# Patient Record
Sex: MALE | Race: WHITE | NOT HISPANIC OR LATINO | Employment: FULL TIME | URBAN - METROPOLITAN AREA
[De-identification: names, ages, dates, MRNs, and addresses within clinical notes are randomized per-mention and may not be internally consistent; named-entity substitution may affect disease eponyms.]

---

## 2017-10-15 ENCOUNTER — HOSPITAL ENCOUNTER (EMERGENCY)
Facility: HOSPITAL | Age: 42
Discharge: HOME | End: 2017-10-15
Attending: EMERGENCY MEDICINE
Payer: COMMERCIAL

## 2017-10-15 VITALS
SYSTOLIC BLOOD PRESSURE: 131 MMHG | WEIGHT: 145 LBS | TEMPERATURE: 98 F | OXYGEN SATURATION: 97 % | HEART RATE: 105 BPM | DIASTOLIC BLOOD PRESSURE: 79 MMHG | BODY MASS INDEX: 23.3 KG/M2 | RESPIRATION RATE: 18 BRPM | HEIGHT: 66 IN
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
## 2017-10-15 DIAGNOSIS — H16.012 CENTRAL CORNEAL ULCER OF LEFT EYE: Primary | ICD-10-CM
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
## 2017-10-15 PROBLEM — H16.002 CORNEAL ULCER OF LEFT EYE: Status: ACTIVE | Noted: 2017-10-15

## 2017-10-15 LAB
GRAM STN SPEC: NORMAL
GRAM STN SPEC: NORMAL

## 2017-10-15 PROCEDURE — 87102 FUNGUS ISOLATION CULTURE: CPT

## 2017-10-15 PROCEDURE — 25000003 PHARM REV CODE 250: Performed by: STUDENT IN AN ORGANIZED HEALTH CARE EDUCATION/TRAINING PROGRAM

## 2017-10-15 PROCEDURE — 87205 SMEAR GRAM STAIN: CPT

## 2017-10-15 PROCEDURE — 87186 SC STD MICRODIL/AGAR DIL: CPT

## 2017-10-15 PROCEDURE — 87070 CULTURE OTHR SPECIMN AEROBIC: CPT

## 2017-10-15 PROCEDURE — 87077 CULTURE AEROBIC IDENTIFY: CPT

## 2017-10-15 PROCEDURE — 87075 CULTR BACTERIA EXCEPT BLOOD: CPT

## 2017-10-15 PROCEDURE — 96372 THER/PROPH/DIAG INJ SC/IM: CPT

## 2017-10-15 PROCEDURE — 25000003 PHARM REV CODE 250: Performed by: EMERGENCY MEDICINE

## 2017-10-15 PROCEDURE — 63600175 PHARM REV CODE 636 W HCPCS: Performed by: EMERGENCY MEDICINE

## 2017-10-15 PROCEDURE — 99284 EMERGENCY DEPT VISIT MOD MDM: CPT | Mod: ,,, | Performed by: EMERGENCY MEDICINE

## 2017-10-15 PROCEDURE — 99284 EMERGENCY DEPT VISIT MOD MDM: CPT | Mod: 25

## 2017-10-15 RX ORDER — PROPARACAINE HYDROCHLORIDE 5 MG/ML
1 SOLUTION/ DROPS OPHTHALMIC
Status: COMPLETED | OUTPATIENT
Start: 2017-10-15 | End: 2017-10-15

## 2017-10-15 RX ORDER — BUPROPION HYDROCHLORIDE 100 MG/1
100 TABLET ORAL 2 TIMES DAILY
COMMUNITY

## 2017-10-15 RX ORDER — TOBRAMYCIN 3 MG/ML
1 SOLUTION/ DROPS OPHTHALMIC EVERY 30 MIN PRN
Qty: 5 ML | Refills: 0 | Status: SHIPPED | OUTPATIENT
Start: 2017-10-15

## 2017-10-15 RX ORDER — ATROPINE SULFATE 10 MG/ML
1 SOLUTION/ DROPS OPHTHALMIC 2 TIMES DAILY
Qty: 5 ML | Refills: 0 | Status: SHIPPED | OUTPATIENT
Start: 2017-10-15

## 2017-10-15 RX ORDER — PROPARACAINE HYDROCHLORIDE 5 MG/ML
1 SOLUTION/ DROPS OPHTHALMIC
Status: DISCONTINUED | OUTPATIENT
Start: 2017-10-15 | End: 2017-10-15

## 2017-10-15 RX ORDER — ATROPINE SULFATE 10 MG/ML
1 SOLUTION/ DROPS OPHTHALMIC 2 TIMES DAILY
Status: DISCONTINUED | OUTPATIENT
Start: 2017-10-15 | End: 2017-10-15

## 2017-10-15 RX ORDER — DEXTROAMPHETAMINE SACCHARATE, AMPHETAMINE ASPARTATE MONOHYDRATE, DEXTROAMPHETAMINE SULFATE AND AMPHETAMINE SULFATE 3.75; 3.75; 3.75; 3.75 MG/1; MG/1; MG/1; MG/1
15 CAPSULE, EXTENDED RELEASE ORAL EVERY MORNING
COMMUNITY

## 2017-10-15 RX ORDER — KETOROLAC TROMETHAMINE 30 MG/ML
30 INJECTION, SOLUTION INTRAMUSCULAR; INTRAVENOUS
Status: COMPLETED | OUTPATIENT
Start: 2017-10-15 | End: 2017-10-15

## 2017-10-15 RX ORDER — ATROPINE SULFATE 10 MG/ML
1 SOLUTION/ DROPS OPHTHALMIC 2 TIMES DAILY
Status: DISCONTINUED | OUTPATIENT
Start: 2017-10-15 | End: 2017-10-15 | Stop reason: HOSPADM

## 2017-10-15 RX ADMIN — Medication 1 DROP: at 08:10

## 2017-10-15 RX ADMIN — KETOROLAC TROMETHAMINE 30 MG: 30 INJECTION, SOLUTION INTRAMUSCULAR at 06:10

## 2017-10-15 RX ADMIN — PROPARACAINE HYDROCHLORIDE 1 DROP: 5 SOLUTION/ DROPS OPHTHALMIC at 06:10

## 2017-10-15 NOTE — CONSULTS
[facility]  Ophthalmology  Consult Note    Patient Name: Minh Madrid  MRN: [id number]  Admission Date: 10/15/2017  Hospital Length of Stay: 0 days  Attending Provider: Wilton Bains MD   Primary Care Physician: Primary Doctor No  Principal Problem:<principal problem not specified>    Inpatient consult to Ophthalmology  Consult performed by: JOSE ANTONIO AZEVEDO  Consult ordered by: WILTON BAINS  Reason for consult: corneal ulcer        Subjective:     Chief Complaint:  L eye pain and photophobia    HPI:   41 y.o. male with a PMHx of depression and ADHD who presents to the ED with a chief complaint of eye injury x 6 days. Patient states that on Monday of last week he was working on a shed and got a piece of metal from a drill shave in his left eye. He wears contact lenses and states that he thought he brushed it out. He reports that he was okay on Tuesday, but on Wednesday he woke up with severe eye pain and photophobia, and he has been in the same state since then. Reports that his last tetanus was 1.5 years ago.     Patient denies any previous eye surgeries/lasers/injections. No fam hx of any eye disease or blindness. Patient does not routinely use eyedrops and states every so often his eyes gets red and he stops wearing contacts for a few days. Patient currently has contact lens in R eye, no contact lens in L eye. Has glasses at home. No exposure to fresh water recently.     No current facility-administered medications on file prior to encounter.      No current outpatient prescriptions on file prior to encounter.       Past Medical History:   Diagnosis Date    ADHD     Depression        History reviewed. No pertinent surgical history.    Review of patient's allergies indicates:  No Known Allergies    Family History     None        Social History Main Topics    Smoking status: Light Tobacco Smoker     Types: Cigarettes    Smokeless tobacco: Never Used    Alcohol use Yes      Comment:  weekeneds    Drug use: No    Sexual activity: Not on file     Review of Systems   Eyes: Positive for photophobia, pain, discharge and redness. Negative for itching and visual disturbance.     Objective:     Vital Signs (Most Recent):  Temp: 98.1 °F (36.7 °C) (10/15/17 0439)  Pulse: 105 (10/15/17 0439)  Resp: 18 (10/15/17 0439)  BP: 131/79 (10/15/17 0439)  SpO2: 97 % (10/15/17 0439) Vital Signs (24h Range):  Temp:  [98.1 °F (36.7 °C)] 98.1 °F (36.7 °C)  Pulse:  [105] 105  Resp:  [18] 18  SpO2:  [97 %] 97 %  BP: (131)/(79) 131/79     Weight: 65.8 kg (145 lb)  Body mass index is 23.4 kg/m².        Base Eye Exam     Visual Acuity (Snellen - Linear)       Right Left    Dist sc  CF@2 ft    Dist cc 20/20     Correction:  Contacts          Tonometry (Tonopen, 10:21 AM)       Right Left    Pressure 11 22          Pupils       Dark Light Shape React APD    Right 3 2 Round Brisk None    Left 3 2.5 Round Sluggish None          Visual Fields       Right Left     Full Full          Extraocular Movement       Right Left     Full, Ortho Full, Ortho          Dilation     Both eyes:  1.0% Mydriacyl, 2.5% phenylephrine @ 10:32 AM            Slit Lamp and Fundus Exam     External Exam       Right Left    External wnl  wnl          Slit Lamp Exam       Right Left    Lids/Lashes wnl pus along lashes    Conjunctiva/Sclera white and quiet 2+ injection     Cornea clear hazy, 4x4.5mm ulcer medial to central region with overlying pus    Anterior Chamber deep and quiet 10% hypopyon    Iris flat, round flat, round    Lens clear clear    Vitreous clear unable          Fundus Exam       Right Left    Disc sharp     C/D Ratio 0.2     Macula normal     Vessels normal     Periphery normal                   Assessment and Plan:     Corneal ulcer of left eye    -patient with 6 day hx of eye injury followed by pain and photophobia   -presents with 4 x 4.5 mm ulcer of L cornea medial to central region with lawrence pus, hazy cornea, and hypopyon    -cultures collected with spatula of L cornea: thioglycolate, blood agar, chocolate agar, sabouraud's, gram stain/aerobic/anaerobic   -fortified vanc and tobra ordered and dropped in L eye - assessed patient's technique with eyedrops   -counseled patient to stop all contact lens use, throw out current contacts/case, and wear glasses for the foreseeable future   -patient would like to follow up closer to [place] where he lives - ophthalmology office will contact patient tomorrow morning to ensure follow up. Best contact number is [phone].  -continue fortified vanc and tobramycin OS q30 min, alternating every 15 min. Atropine BID OS for pain relief.   -counseled patient on importance of taking eyedrops, including nighttime   -patient and wife expressed understanding     Discussed with Dr. Grover             Thank you for your consult.    Neeta Vega MD  Ophthalmology PGY-2  [facility]

## 2017-10-15 NOTE — ED NOTES
"Wife presented outside of the room. Wife upset, states "We've been here since 4am. We have 5 kids. We need to get home. I want to speak to the supervisor. This is ridiculous." Gerri, Charge Nurse, notified. Awaiting ophthalmology consult. Wife informed.  "

## 2017-10-15 NOTE — ED PROVIDER NOTES
"Encounter Date: 10/15/2017    SCRIBE #1 NOTE: I, Bibiana Santana, am scribing for, and in the presence of, Dr. Galvez.       History     Chief Complaint   Patient presents with    Eye Injury     pt got a metal shaving in left eye earlier this week and went to [place] last night and was sent here for optho consult bc pt now has white/greenish puss leaking from left eye; pt states he can see shapes and colors with left eye only; pain 10/10     Time patient was seen by the provider: 6:36 AM      This is a 41 y.o. male with a PMHx of depression and ADHD who presents to the ED with a chief complaint of eye injury x 6 days. Patient states that on Monday of last week he was working on a shed and got a piece of metal from a drill shave in his left eye. He wears contact lenses and states that he thought he brushed it out. He reports that he was okay on Tuesday, but on Wednesday he woke up with severe eye pain, and he has been in the same state since then. He reports that he can only see "a haze" of shapes out of his left eye. Endorses photophobia. Reports that his last tetanus was 1.5 years ago.       The history is provided by the patient and medical records.     Review of patient's allergies indicates:  No Known Allergies  Past Medical History:   Diagnosis Date    ADHD     Depression      History reviewed. No pertinent surgical history.  No family history on file.  Social History   Substance Use Topics    Smoking status: Light Tobacco Smoker     Types: Cigarettes    Smokeless tobacco: Never Used    Alcohol use Yes      Comment: weekeneds     Review of Systems   Constitutional: Negative for chills, fatigue and fever.   HENT: Negative for nosebleeds and rhinorrhea.    Eyes: Positive for photophobia, pain and visual disturbance.   Respiratory: Negative for cough and shortness of breath.    Cardiovascular: Negative for chest pain and palpitations.   Gastrointestinal: Negative for abdominal pain, diarrhea, nausea and " vomiting.   Genitourinary: Negative for difficulty urinating, dysuria and hematuria.   Musculoskeletal: Negative for back pain and neck pain.   Skin: Negative for rash.   Neurological: Negative for weakness, light-headedness and numbness.       Physical Exam     Initial Vitals [10/15/17 0439]   BP Pulse Resp Temp SpO2   131/79 105 18 98.1 °F (36.7 °C) 97 %      MAP       96.33         Physical Exam    Nursing note and vitals reviewed.  Constitutional: He appears well-developed and well-nourished. He is not diaphoretic.   Appears uncomfortable.   HENT:   Head: Normocephalic.   Eyes: Left conjunctiva is injected.   Area of puss to the superior nasal aspect of the cornea of left eye. He is photophobic.   Neck: Normal range of motion. Neck supple. No JVD present.   Cardiovascular: Normal rate, regular rhythm and normal heart sounds.   No murmur heard.  Pulmonary/Chest: Breath sounds normal. No respiratory distress. He has no wheezes. He has no rhonchi. He has no rales.   Abdominal: Soft. Bowel sounds are normal. He exhibits no distension and no mass. There is no tenderness. There is no rebound and no guarding.   Musculoskeletal: Normal range of motion. He exhibits no edema or tenderness.   Neurological: He is alert and oriented to person, place, and time. He has normal strength. No cranial nerve deficit or sensory deficit.   Skin: Skin is warm and dry. No rash noted.   Psychiatric: He has a normal mood and affect.         ED Course   Procedures  Labs Reviewed   GRAM STAIN   CULTURE, ANAEROBIC   CULTURE, AEROBIC  (SPECIFY SOURCE)   CULTURE, FUNGUS             Medical Decision Making:   History:   Old Medical Records: I decided to obtain old medical records.  Initial Assessment:   Patient with infection to his eye with visual disturbance and photophobia. Will consult Opthalmology.   ED Management:  7:28 AM Consult and discussed with Optho.   Other:   I have discussed this case with another health care provider.         "    Scribe Attestation:   Scribe #1: I performed the above scribed service and the documentation accurately describes the services I performed. I attest to the accuracy of the note.    Attending Attestation:   Physician Attestation Statement for Resident:  As the supervising MD   Physician Attestation Statement: I have personally seen and examined this patient.   I agree with the above history. -:   As the supervising MD I agree with the above PE.    As the supervising MD I agree with the above treatment, course, plan, and disposition.                Patient discharged with prescriptions for ophthalmic atropine, vancomycin, and tobramycin. Encouraged follow up with ophthalmology who will contact him. Discharged home with return precautions.   Brianna Peterson MD, PGY-4  10:33 AM    Patient's wife called complaining of patient's pain. She reported the "pain shot" [toradol] had worn off and we gave him no pain pills. She requested pain pills for . I reiterated several times that he was given a prescription for atropine drops for pain relief. She wanted to know why he wasn't sent home with the "numbing drops" [proparacaine], and I told her that those can actually make the ulcer worse, and we do not prescribe those. Further, patient's wife again instructed to use the fortified vancomycin and fortified tobramycin as prescribed to help prevent infections along with the atropine solution for pain relief in eye. Further, she was instructed he could use tylenol and ibuprofen or motrin in addition to atropine drops for pain relief. Patient's wife again requested pills for pain, and above reiterated. Patient's wife was agitated and hung up.   Brianna Peterson MD, PGY-4  12:28 PM            ED Course      Clinical Impression:   The encounter diagnosis was Central corneal ulcer of left eye.    Disposition:   Disposition: Discharged  Condition: Stable                        Wilton Galvez MD  10/19/17 " [id number]

## 2017-10-15 NOTE — ED NOTES
Pt identifiers Minh Madrid were checked and correct  LOC: The patient is awake, alert, aware of environment with an appropriate affect. Oriented x3, speaking appropriately  APPEARANCE: Pt resting comfortably, in no acute distress, pt is clean and well groomed, clothing properly fastened  SKIN: Skin warm, dry and intact, normal skin turgor, moist mucus membranes  RESPIRATORY: Airway is open and patent, respirations are spontaneous, even and unlabored, normal effort and rate  CARDIAC: Normal rate and rhythm, no peripheral edema noted, capillary refill < 3 seconds, bilateral radial pulses 2+  ABDOMEN: Soft, non tender, non distended. Bowel sounds present X 4 quadrants.   NEUROLOGIC: PERRLA, facial expression is symmetrical, patient moving all extremities spontaneously, normal sensation in all extremities when touched with a finger.  Follows all commands appropriately. Pt c/o  Left eye pain after getting metal in his left eye on Tuesday while working on a shed.   MUSCULOSKELETAL: No obvious deformities.

## 2017-10-15 NOTE — ED NOTES
I acknowledge care of this pt. The patient is awake, alert, and cooperative with a calm affect. Patient is aware of environment, airway is open and patent, respirations are spontaneous; normal effort and rate noted, skin warm and dry, moves all extremities well. No apparent distress noted, bed placed in low position, side rails up x2, call light is within reach of patient. Pt aware of POC. Offers no complaints at this time.

## 2017-10-15 NOTE — SUBJECTIVE & OBJECTIVE
No current facility-administered medications on file prior to encounter.      No current outpatient prescriptions on file prior to encounter.       Past Medical History:   Diagnosis Date    ADHD     Depression        History reviewed. No pertinent surgical history.    Review of patient's allergies indicates:  No Known Allergies    Family History     None        Social History Main Topics    Smoking status: Light Tobacco Smoker     Types: Cigarettes    Smokeless tobacco: Never Used    Alcohol use Yes      Comment: weekeneds    Drug use: No    Sexual activity: Not on file     Review of Systems   Eyes: Positive for photophobia, pain, discharge and redness. Negative for itching and visual disturbance.     Objective:     Vital Signs (Most Recent):  Temp: 98.1 °F (36.7 °C) (10/15/17 0439)  Pulse: 105 (10/15/17 0439)  Resp: 18 (10/15/17 0439)  BP: 131/79 (10/15/17 0439)  SpO2: 97 % (10/15/17 0439) Vital Signs (24h Range):  Temp:  [98.1 °F (36.7 °C)] 98.1 °F (36.7 °C)  Pulse:  [105] 105  Resp:  [18] 18  SpO2:  [97 %] 97 %  BP: (131)/(79) 131/79     Weight: 65.8 kg (145 lb)  Body mass index is 23.4 kg/m².

## 2017-10-15 NOTE — DISCHARGE INSTRUCTIONS
Please stop all contact lens use, throw out current contacts/case, and wear glasses for the foreseeable future   Patient would like to follow up closer to [place] where he lives - ophthalmology office will contact patient tomorrow morning to ensure follow up.   Please continue fortified vanc and tobramycin OS q30 min, alternating every 15 min. Atropine BID OS for pain relief. Prescription given.

## 2017-10-15 NOTE — ASSESSMENT & PLAN NOTE
-patient with 6 day hx of eye injury followed by pain and photophobia   -presents with 4 x 4.5 mm ulcer of L cornea medial to central region with lawrence pus, hazy cornea, and hypopyon   -cultures collected with spatula of L cornea: thioglycolate, blood agar, chocolate agar, sabouraud's, gram stain/aerobic/anaerobic   -fortified vanc and tobra ordered and dropped in L eye - assessed patient's technique with eyedrops   -counseled patient to stop all contact lens use, throw out current contacts/case, and wear glasses for the foreseeable future   -patient would like to follow up closer to [place] where he lives - ophthalmology office will contact patient tomorrow morning to ensure follow up. Best contact number is [phone].  -continue fortified vanc and tobramycin OS q30 min, alternating every 15 min. Atropine BID OS for pain relief.   -counseled patient on importance of taking eyedrops, including nighttime   -patient and wife expressed understanding     Discussed with Dr. Grover

## 2017-10-15 NOTE — HPI
41 y.o. male with a PMHx of depression and ADHD who presents to the ED with a chief complaint of eye injury x 6 days. Patient states that on Monday of last week he was working on a shed and got a piece of metal from a drill shave in his left eye. He wears contact lenses and states that he thought he brushed it out. He reports that he was okay on Tuesday, but on Wednesday he woke up with severe eye pain and photophobia, and he has been in the same state since then. Reports that his last tetanus was 1.5 years ago.     Patient denies any previous eye surgeries/lasers/injections. No fam hx of any eye disease or blindness. Patient does not routinely use eyedrops and states every so often his eyes gets red and he stops wearing contacts for a few days. Patient currently has contact lens in R eye, no contact lens in L eye. Has glasses at home. No exposure to fresh water recently.

## 2017-10-16 ENCOUNTER — TELEPHONE (OUTPATIENT)
Dept: OPHTHALMOLOGY | Facility: CLINIC | Age: 42
End: 2017-10-16

## 2017-10-17 LAB
BACTERIA SPEC AEROBE CULT: NORMAL
BACTERIA SPEC AEROBE CULT: NORMAL

## 2017-10-23 LAB — BACTERIA SPEC ANAEROBE CULT: NORMAL

## 2017-11-14 LAB — FUNGUS SPEC CULT: NORMAL

## 2018-05-15 ENCOUNTER — CLINICAL SUPPORT (OUTPATIENT)
Dept: OCCUPATIONAL MEDICINE | Facility: CLINIC | Age: 43
End: 2018-05-15

## 2018-05-15 DIAGNOSIS — Z02.83 ENCOUNTER FOR DRUG SCREENING: Primary | ICD-10-CM

## 2018-05-15 PROCEDURE — 80305 DRUG TEST PRSMV DIR OPT OBS: CPT | Mod: S$GLB,,, | Performed by: PHYSICIAN ASSISTANT

## 2022-06-15 ENCOUNTER — HOSPITAL ENCOUNTER (EMERGENCY)
Facility: HOSPITAL | Age: 47
Discharge: LEFT BEFORE DISCHARGE | End: 2022-06-16
Payer: MEDICAID

## 2022-06-15 VITALS
DIASTOLIC BLOOD PRESSURE: 88 MMHG | TEMPERATURE: 98 F | SYSTOLIC BLOOD PRESSURE: 141 MMHG | OXYGEN SATURATION: 97 % | RESPIRATION RATE: 18 BRPM | WEIGHT: 150 LBS | HEART RATE: 107 BPM | BODY MASS INDEX: 23.54 KG/M2 | HEIGHT: 67 IN

## 2022-06-15 PROCEDURE — 99900041 HC LEFT WITHOUT BEING SEEN- EMERGENCY

## 2022-06-16 ENCOUNTER — HOSPITAL ENCOUNTER (EMERGENCY)
Facility: HOSPITAL | Age: 47
Discharge: LEFT BEFORE DISCHARGE | End: 2022-06-16
Attending: EMERGENCY MEDICINE
Payer: MEDICAID

## 2022-06-16 VITALS
HEIGHT: 67 IN | OXYGEN SATURATION: 99 % | WEIGHT: 150 LBS | TEMPERATURE: 98 F | DIASTOLIC BLOOD PRESSURE: 92 MMHG | RESPIRATION RATE: 15 BRPM | HEART RATE: 85 BPM | BODY MASS INDEX: 23.54 KG/M2 | SYSTOLIC BLOOD PRESSURE: 135 MMHG
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
## 2022-06-16 DIAGNOSIS — S01.81XA FACIAL LACERATION, INITIAL ENCOUNTER: Primary | ICD-10-CM

## 2022-06-16 PROCEDURE — 99282 EMERGENCY DEPT VISIT SF MDM: CPT
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
## 2023-03-27 ENCOUNTER — HOSPITAL ENCOUNTER (EMERGENCY)
Facility: HOSPITAL | Age: 48
Discharge: HOME | End: 2023-03-27
Attending: EMERGENCY MEDICINE
Payer: MEDICAID

## 2023-03-27 VITALS
TEMPERATURE: 98 F | RESPIRATION RATE: 20 BRPM | WEIGHT: 155.19 LBS | DIASTOLIC BLOOD PRESSURE: 70 MMHG | BODY MASS INDEX: 24.36 KG/M2 | SYSTOLIC BLOOD PRESSURE: 121 MMHG | HEIGHT: 67 IN | HEART RATE: 80 BPM | OXYGEN SATURATION: 98 %

## 2023-03-27 DIAGNOSIS — R10.9 FLANK PAIN: ICD-10-CM

## 2023-03-27 DIAGNOSIS — N20.1 URETERAL CALCULUS: Primary | ICD-10-CM

## 2023-03-27 LAB
ALBUMIN SERPL BCP-MCNC: 4 G/DL (ref 3.5–5.2)
ALP SERPL-CCNC: 48 U/L (ref 55–135)
ALT SERPL W/O P-5'-P-CCNC: 20 U/L (ref 10–44)
ANION GAP SERPL CALC-SCNC: 12 MMOL/L (ref 8–16)
AST SERPL-CCNC: 23 U/L (ref 10–40)
BACTERIA #/AREA URNS HPF: NEGATIVE /HPF
BASOPHILS # BLD AUTO: 0.04 K/UL (ref 0–0.2)
BASOPHILS NFR BLD: 0.3 % (ref 0–1.9)
BILIRUB SERPL-MCNC: 0.6 MG/DL (ref 0.1–1)
BILIRUB UR QL STRIP: NEGATIVE
BUN SERPL-MCNC: 22 MG/DL (ref 6–20)
CALCIUM SERPL-MCNC: 9 MG/DL (ref 8.7–10.5)
CHLORIDE SERPL-SCNC: 102 MMOL/L (ref 95–110)
CLARITY UR: CLEAR
CO2 SERPL-SCNC: 25 MMOL/L (ref 23–29)
COLOR UR: YELLOW
CREAT SERPL-MCNC: 1.2 MG/DL (ref 0.5–1.4)
DIFFERENTIAL METHOD: ABNORMAL
EOSINOPHIL # BLD AUTO: 0 K/UL (ref 0–0.5)
EOSINOPHIL NFR BLD: 0.3 % (ref 0–8)
ERYTHROCYTE [DISTWIDTH] IN BLOOD BY AUTOMATED COUNT: 12.8 % (ref 11.5–14.5)
EST. GFR  (NO RACE VARIABLE): >60 ML/MIN/1.73 M^2
GLUCOSE SERPL-MCNC: 118 MG/DL (ref 70–110)
GLUCOSE UR QL STRIP: NEGATIVE
HCT VFR BLD AUTO: 43.4 % (ref 40–54)
HGB BLD-MCNC: 14.6 G/DL (ref 14–18)
HGB UR QL STRIP: ABNORMAL
HYALINE CASTS #/AREA URNS LPF: 4 /LPF
IMM GRANULOCYTES # BLD AUTO: 0.06 K/UL (ref 0–0.04)
IMM GRANULOCYTES NFR BLD AUTO: 0.5 % (ref 0–0.5)
KETONES UR QL STRIP: NEGATIVE
LEUKOCYTE ESTERASE UR QL STRIP: NEGATIVE
LIPASE SERPL-CCNC: 35 U/L (ref 4–60)
LYMPHOCYTES # BLD AUTO: 1.6 K/UL (ref 1–4.8)
LYMPHOCYTES NFR BLD: 11.9 % (ref 18–48)
MCH RBC QN AUTO: 31 PG (ref 27–31)
MCHC RBC AUTO-ENTMCNC: 33.6 G/DL (ref 32–36)
MCV RBC AUTO: 92 FL (ref 82–98)
MICROSCOPIC COMMENT: ABNORMAL
MONOCYTES # BLD AUTO: 0.6 K/UL (ref 0.3–1)
MONOCYTES NFR BLD: 4.2 % (ref 4–15)
NEUTROPHILS # BLD AUTO: 10.8 K/UL (ref 1.8–7.7)
NEUTROPHILS NFR BLD: 82.8 % (ref 38–73)
NITRITE UR QL STRIP: NEGATIVE
NRBC BLD-RTO: 0 /100 WBC
PH UR STRIP: 7 [PH] (ref 5–8)
PLATELET # BLD AUTO: 316 K/UL (ref 150–450)
PMV BLD AUTO: 8.9 FL (ref 9.2–12.9)
POTASSIUM SERPL-SCNC: 3.3 MMOL/L (ref 3.5–5.1)
PROT SERPL-MCNC: 6.7 G/DL (ref 6–8.4)
PROT UR QL STRIP: ABNORMAL
RBC # BLD AUTO: 4.71 M/UL (ref 4.6–6.2)
RBC #/AREA URNS HPF: >100 /HPF (ref 0–4)
SODIUM SERPL-SCNC: 139 MMOL/L (ref 136–145)
SP GR UR STRIP: 1.02 (ref 1–1.03)
SQUAMOUS #/AREA URNS HPF: 1 /HPF
TROPONIN I SERPL HS-MCNC: 5.3 PG/ML (ref 0–14.9)
URN SPEC COLLECT METH UR: ABNORMAL
UROBILINOGEN UR STRIP-ACNC: NEGATIVE EU/DL
WBC # BLD AUTO: 13.08 K/UL (ref 3.9–12.7)
WBC #/AREA URNS HPF: 3 /HPF (ref 0–5)

## 2023-03-27 PROCEDURE — 83690 ASSAY OF LIPASE: CPT | Performed by: EMERGENCY MEDICINE

## 2023-03-27 PROCEDURE — 63600175 PHARM REV CODE 636 W HCPCS: Performed by: EMERGENCY MEDICINE

## 2023-03-27 PROCEDURE — 96376 TX/PRO/DX INJ SAME DRUG ADON: CPT

## 2023-03-27 PROCEDURE — 96361 HYDRATE IV INFUSION ADD-ON: CPT

## 2023-03-27 PROCEDURE — 93005 ELECTROCARDIOGRAM TRACING: CPT | Performed by: INTERNAL MEDICINE

## 2023-03-27 PROCEDURE — 84484 ASSAY OF TROPONIN QUANT: CPT | Performed by: EMERGENCY MEDICINE

## 2023-03-27 PROCEDURE — 93010 EKG 12-LEAD: ICD-10-PCS | Mod: ,,, | Performed by: INTERNAL MEDICINE

## 2023-03-27 PROCEDURE — 80053 COMPREHEN METABOLIC PANEL: CPT | Performed by: EMERGENCY MEDICINE

## 2023-03-27 PROCEDURE — 93010 ELECTROCARDIOGRAM REPORT: CPT | Mod: ,,, | Performed by: INTERNAL MEDICINE

## 2023-03-27 PROCEDURE — 99285 EMERGENCY DEPT VISIT HI MDM: CPT | Mod: 25

## 2023-03-27 PROCEDURE — 85025 COMPLETE CBC W/AUTO DIFF WBC: CPT | Performed by: EMERGENCY MEDICINE

## 2023-03-27 PROCEDURE — 81001 URINALYSIS AUTO W/SCOPE: CPT | Performed by: EMERGENCY MEDICINE

## 2023-03-27 PROCEDURE — 96374 THER/PROPH/DIAG INJ IV PUSH: CPT

## 2023-03-27 PROCEDURE — 96375 TX/PRO/DX INJ NEW DRUG ADDON: CPT

## 2023-03-27 PROCEDURE — 25000003 PHARM REV CODE 250: Performed by: EMERGENCY MEDICINE

## 2023-03-27 RX ORDER — HYDROCODONE BITARTRATE AND ACETAMINOPHEN 5; 325 MG/1; MG/1
1 TABLET ORAL EVERY 6 HOURS PRN
Qty: 12 TABLET | Refills: 0 | Status: SHIPPED | OUTPATIENT
Start: 2023-03-27

## 2023-03-27 RX ORDER — ONDANSETRON 2 MG/ML
4 INJECTION INTRAMUSCULAR; INTRAVENOUS
Status: COMPLETED | OUTPATIENT
Start: 2023-03-27 | End: 2023-03-27

## 2023-03-27 RX ORDER — KETOROLAC TROMETHAMINE 30 MG/ML
15 INJECTION, SOLUTION INTRAMUSCULAR; INTRAVENOUS
Status: COMPLETED | OUTPATIENT
Start: 2023-03-27 | End: 2023-03-27

## 2023-03-27 RX ORDER — HYDROMORPHONE HYDROCHLORIDE 1 MG/ML
0.5 INJECTION, SOLUTION INTRAMUSCULAR; INTRAVENOUS; SUBCUTANEOUS
Status: COMPLETED | OUTPATIENT
Start: 2023-03-27 | End: 2023-03-27

## 2023-03-27 RX ORDER — TAMSULOSIN HYDROCHLORIDE 0.4 MG/1
0.4 CAPSULE ORAL DAILY
Qty: 10 CAPSULE | Refills: 0 | Status: SHIPPED | OUTPATIENT
Start: 2023-03-27 | End: 2024-03-26

## 2023-03-27 RX ORDER — ONDANSETRON 4 MG/1
4 TABLET, FILM COATED ORAL EVERY 8 HOURS PRN
Qty: 12 TABLET | Refills: 0 | Status: SHIPPED | OUTPATIENT
Start: 2023-03-27

## 2023-03-27 RX ADMIN — SODIUM CHLORIDE 1000 ML: 0.9 INJECTION, SOLUTION INTRAVENOUS at 12:03

## 2023-03-27 RX ADMIN — SODIUM CHLORIDE 1000 ML: 0.9 INJECTION, SOLUTION INTRAVENOUS at 06:03

## 2023-03-27 RX ADMIN — ONDANSETRON 4 MG: 2 INJECTION INTRAMUSCULAR; INTRAVENOUS at 12:03

## 2023-03-27 RX ADMIN — HYDROMORPHONE HYDROCHLORIDE 0.5 MG: 0.5 INJECTION, SOLUTION INTRAMUSCULAR; INTRAVENOUS; SUBCUTANEOUS at 06:03

## 2023-03-27 RX ADMIN — SODIUM CHLORIDE 1000 ML: 9 INJECTION, SOLUTION INTRAVENOUS at 02:03

## 2023-03-27 RX ADMIN — HYDROMORPHONE HYDROCHLORIDE 0.5 MG: 0.5 INJECTION, SOLUTION INTRAMUSCULAR; INTRAVENOUS; SUBCUTANEOUS at 12:03

## 2023-03-27 RX ADMIN — KETOROLAC TROMETHAMINE 15 MG: 30 INJECTION, SOLUTION INTRAMUSCULAR; INTRAVENOUS at 12:03

## 2023-03-27 NOTE — ED PROVIDER NOTES
Encounter Date: 3/27/2023       History     Chief Complaint   Patient presents with    Testicle Pain     Right testicle pain x2 hours after having bowel movement     Patient reports 2 hour history of right flank pain.  Positive nausea and vomiting.  Pain radiates to the testicles.  No fever chills.  No similar symptoms in the past.  Pain is described as severe.    Review of patient's allergies indicates:  No Known Allergies  Past Medical History:   Diagnosis Date    ADHD     Depression      No past surgical history on file.  No family history on file.  Social History     Tobacco Use    Smoking status: Light Smoker     Types: Cigarettes    Smokeless tobacco: Never   Substance Use Topics    Alcohol use: Yes     Comment: weekeneds    Drug use: No     Review of Systems   Constitutional:  Negative for chills and fever.   HENT:  Negative for sore throat.    Eyes:  Negative for photophobia and visual disturbance.   Respiratory:  Negative for shortness of breath.    Cardiovascular:  Negative for chest pain.   Gastrointestinal:  Positive for nausea and vomiting.   Genitourinary:  Positive for flank pain. Negative for dysuria and hematuria.   Musculoskeletal:  Negative for joint swelling.   Skin:  Negative for rash.   Neurological:  Negative for weakness and headaches.   Psychiatric/Behavioral:  Negative for confusion.      Physical Exam     Initial Vitals [03/27/23 1236]   BP Pulse Resp Temp SpO2   (!) 178/108 90 (!) 22 98 °F (36.7 °C) 99 %      MAP       --         Physical Exam    Nursing note and vitals reviewed.  Constitutional: He is not diaphoretic. No distress.   HENT:   Head: Normocephalic and atraumatic.   Eyes: Conjunctivae are normal.   Neck:   Normal range of motion.  Cardiovascular:  Regular rhythm.           Pulmonary/Chest: Breath sounds normal.   Abdominal: Abdomen is soft. There is no abdominal tenderness.   Genitourinary:    Genitourinary Comments: Normal male external genitalia.  No reproducible scrotal  pain.     Musculoskeletal:         General: Normal range of motion.      Cervical back: Normal range of motion.     Neurological: He is alert and oriented to person, place, and time. He has normal strength. No cranial nerve deficit or sensory deficit.   Skin: No rash noted.   Psychiatric:   Alert, anxious       ED Course   Procedures  Labs Reviewed   CBC W/ AUTO DIFFERENTIAL - Abnormal; Notable for the following components:       Result Value    WBC 13.08 (*)     MPV 8.9 (*)     Gran # (ANC) 10.8 (*)     Immature Grans (Abs) 0.06 (*)     Gran % 82.8 (*)     Lymph % 11.9 (*)     All other components within normal limits   COMPREHENSIVE METABOLIC PANEL - Abnormal; Notable for the following components:    Potassium 3.3 (*)     Glucose 118 (*)     BUN 22 (*)     Alkaline Phosphatase 48 (*)     All other components within normal limits   LIPASE   TROPONIN I HIGH SENSITIVITY   URINALYSIS, REFLEX TO URINE CULTURE        ECG Results              EKG 12-lead (In process)  Result time 03/27/23 13:07:35      In process by Interface, Lab In [facility] (03/27/23 13:07:35)                   Narrative:    Test Reason : R10.9,    Vent. Rate : 073 BPM     Atrial Rate : 073 BPM     P-R Int : 108 ms          QRS Dur : 102 ms      QT Int : 434 ms       P-R-T Axes : 048 072 067 degrees     QTc Int : 478 ms    Sinus rhythm with short MS with Premature supraventricular complexes  Otherwise normal ECG  No previous ECGs available    Referred By: [email or web address]   SELF           Confirmed By:                                   Imaging Results              CT Renal Stone Study ABD Pelvis WO (Final result)  Result time 03/27/23 13:53:30      Final result by Marisol To MD (03/27/23 13:53:30)                   Narrative:    CMS MANDATED QUALITY DATA - CT RADIATION - 436    All CT scans at this facility utilize dose modulation, iterative reconstruction, and/or weight based dosing when appropriate to reduce radiation dose to as low as reasonably  achievable.    HISTORY: Right flank pain    FINDINGS: Noncontrast axial images were obtained. Nonenhanced study is tailored for the detection of urolithiasis, and is insensitive for abnormalities of the solid organs, vasculature and hollow viscera.    CT ABDOMEN: There are reticular nodular opacities in the right middle lobe which is nonspecific. The remainder of the lung bases are clear.  The liver, spleen and pancreas have a normal noncontrast appearance. There is no biliary duct dilatation or focal mass.  Gallbladder and adrenal glands are normal.      Kidneys: There is mild right hydroureteronephrosis secondary to a 2 mm distal right ureteral stone. There are tiny nonobstructing right renal stones.  The left kidney is normal in appearance without hydronephrosis or perinephric stranding.    There are no thick-walled or dilated bowel loops. The appendix is normal. There is no mesenteric or retroperitoneal adenopathy. The aorta is normal in caliber. The musculature is normal.    CT PELVIS: The bladder and prostate gland are normal. There is no pelvic adenopathy. There are no acute osseous abnormalities.    IMPRESSION: 2 mm right distal ureteral stone resulting in mild right hydroureteronephrosis    Tiny nonobstructing right renal stones    Faint reticular nodular opacities within the medial right middle lobe which is nonspecific and may represent infectious or inflammatory process    Electronically signed by:  Marisol To MD  3/27/2023 1:53 PM CDT Workstation: [id number]                                     Medications   sodium chloride 0.9% bolus 1,000 mL 1,000 mL (1,000 mLs Intravenous New Bag 3/27/23 1254)   ketorolac injection 15 mg (15 mg Intravenous Given 3/27/23 1254)   ondansetron injection 4 mg (4 mg Intravenous Given 3/27/23 1254)   HYDROmorphone injection 0.5 mg (0.5 mg Intravenous Given 3/27/23 1254)   sodium chloride 0.9% bolus 1,000 mL 1,000 mL (0 mLs Intravenous Stopped 3/27/23 1530)   sodium  chloride 0.9% bolus 1,000 mL 1,000 mL (0 mLs Intravenous Stopped 3/27/23 1909)   HYDROmorphone injection 0.5 mg (0.5 mg Intravenous Given 3/27/23 1809)     Medical Decision Making:   History:   Old Medical Records: I decided to obtain old medical records.  Differential Diagnosis:   Ureteral calculus, acute appendicitis, testicular torsion  Independently Interpreted Test(s):   I have ordered and independently interpreted EKG Reading(s) - see summary below       <> Summary of EKG Reading(s): Normal sinus rhythm with no acute ST segment changes  Clinical Tests:   Lab Tests: Reviewed  The following lab test(s) were unremarkable: CBC and CMP  Radiological Study: Reviewed  Medical Tests: Reviewed  ED Management:  Patient presents with right flank pain.  Scrotal exam is normal.  CT scan improves 2 mm right distal ureteral calculus.  Patient treated with multiple rounds of pain medication.  Patient now sitting up in bed smiling.  Will treat symptomatically and refer to Urology.                        Clinical Impression:   Final diagnoses:  [R10.9] Flank pain  [N20.1] Ureteral calculus (Primary)               Alo Colby MD  03/27/23 1945       Alo Colby MD  03/27/23 2007